# Patient Record
Sex: MALE | Race: WHITE | NOT HISPANIC OR LATINO | Employment: FULL TIME | URBAN - METROPOLITAN AREA
[De-identification: names, ages, dates, MRNs, and addresses within clinical notes are randomized per-mention and may not be internally consistent; named-entity substitution may affect disease eponyms.]

---

## 2023-06-16 PROCEDURE — 99284 EMERGENCY DEPT VISIT MOD MDM: CPT | Mod: 25

## 2023-06-16 PROCEDURE — 250N000013 HC RX MED GY IP 250 OP 250 PS 637: Performed by: EMERGENCY MEDICINE

## 2023-06-16 PROCEDURE — 29125 APPL SHORT ARM SPLINT STATIC: CPT | Mod: RT

## 2023-06-16 RX ORDER — IBUPROFEN 600 MG/1
600 TABLET, FILM COATED ORAL ONCE
Status: COMPLETED | OUTPATIENT
Start: 2023-06-17 | End: 2023-06-16

## 2023-06-16 RX ADMIN — IBUPROFEN 600 MG: 600 TABLET, FILM COATED ORAL at 23:57

## 2023-06-17 ENCOUNTER — HOSPITAL ENCOUNTER (EMERGENCY)
Facility: CLINIC | Age: 53
Discharge: HOME | End: 2023-06-17
Attending: EMERGENCY MEDICINE | Admitting: EMERGENCY MEDICINE
Payer: COMMERCIAL

## 2023-06-17 ENCOUNTER — APPOINTMENT (OUTPATIENT)
Dept: GENERAL RADIOLOGY | Facility: CLINIC | Age: 53
End: 2023-06-17
Attending: EMERGENCY MEDICINE
Payer: COMMERCIAL

## 2023-06-17 VITALS
OXYGEN SATURATION: 98 % | DIASTOLIC BLOOD PRESSURE: 90 MMHG | RESPIRATION RATE: 20 BRPM | SYSTOLIC BLOOD PRESSURE: 148 MMHG | TEMPERATURE: 98.3 F | HEART RATE: 68 BPM

## 2023-06-17 DIAGNOSIS — S63.501A SPRAIN OF RIGHT WRIST, INITIAL ENCOUNTER: ICD-10-CM

## 2023-06-17 PROCEDURE — 73110 X-RAY EXAM OF WRIST: CPT | Mod: RT

## 2023-06-17 RX ORDER — IBUPROFEN 600 MG/1
600 TABLET, FILM COATED ORAL ONCE
Status: DISCONTINUED | OUTPATIENT
Start: 2023-06-17 | End: 2023-06-17 | Stop reason: HOSPADM

## 2023-06-17 RX ORDER — OXYCODONE AND ACETAMINOPHEN 5; 325 MG/1; MG/1
1 TABLET ORAL ONCE
Status: DISCONTINUED | OUTPATIENT
Start: 2023-06-17 | End: 2023-06-17 | Stop reason: HOSPADM

## 2023-06-17 ASSESSMENT — ACTIVITIES OF DAILY LIVING (ADL): ADLS_ACUITY_SCORE: 35

## 2023-06-17 NOTE — ED PROVIDER NOTES
History     Chief Complaint:  Fall       HPI   Chelo Norwood is a 52 year old male who presents for evaluation of a fall. The patient reports that he fell off a ladder around 1800, landing on his right arm. He endorses right wrist pain, but no other head or body injuries. He is not anticoagulated. The patient denies any notable past medical history.      Independent Historian:   None - Patient Only    Review of External Notes:   None       Medications:    The patient is not currently taking any prescribed medications.      Past Medical History:    The patient denies any significant past medical history.      Physical Exam   Patient Vitals for the past 24 hrs:   BP Temp Temp src Pulse Resp SpO2   06/16/23 2352 (!) 171/114 98.3  F (36.8  C) Temporal 71 20 97 %        Physical Exam  Constitutional:  Oriented to person, place, and time. Well appearing  HENT:   Head:    Normocephalic. Atraumatic   Mouth/Throat:   Oropharynx is clear and moist.   Eyes:    EOM are normal. Pupils are equal, round, and reactive to light.   Neck:    Neck supple.   Cardiovascular:  Normal rate, regular rhythm and normal heart sounds.      Exam reveals no gallop and no friction rub.       No murmur heard.  Pulmonary/Chest:  Effort normal and breath sounds normal.      No respiratory distress. No wheezes. No rales.      No reproducible chest wall pain.  Abdominal:   Soft. No distension. No tenderness. No rebound and no guarding.   Musculoskeletal:  Normal range of motion. No midline spine tenderness. Right dorsal wrist tenderness. No deformity. Limited ROM due to pain. 2+ distal pulses.  Neurological:   Alert and oriented to person, place, and time.           Moves all 4 extremities spontaneously. GCS 15. Strength and sensation are intact to upper extremity.  Skin:    No rash noted. No pallor. No abrasions or ecchymosis. Notable swelling    Emergency Department Course     Imaging:  XR Wrist Right G/E 3 Views   Final Result   IMPRESSION:    3  views right wrist. Mild to moderate dorsal soft tissue swelling about the carpal structures. The osseous structures are intact and normally mineralized.  There is no evidence of fracture or dislocation.  There are no lytic, blastic, or destructive    lesions.  The joint spaces are well preserved.  There is no evidence of joint effusion.   If continued clinical concern, consider conservative therapy, follow-up films, and/or clinical/orthopedic follow-up recommended.                     Report per radiology      Emergency Department Course & Assessments:      Interventions:  Medications   oxyCODONE-acetaminophen (PERCOCET) 5-325 MG per tablet 1 tablet (has no administration in time range)   ibuprofen (ADVIL/MOTRIN) tablet 600 mg (has no administration in time range)   ibuprofen (ADVIL/MOTRIN) tablet 600 mg (600 mg Oral $Given 6/16/23 9248)        Assessments:  0235 I obtained history and examined the patient as noted above. Velcro cock-up wrist splint will be applied by RN.    Independent Interpretation (X-rays, CTs, rhythm strip):  X-ray of the right wrist-no acute fracture.  Interpreted by myself.  Consultations/Discussion of Management or Tests:  None        Social Determinants of Health affecting care:   None    Disposition:  The patient was discharged to home.     Impression & Plan      Medical Decision Making:  Chelo Norwood is a 52 year old male who presents for evaluation of wrist pain following a fall off a ladder. He is tender over the right dorsal wrist.  X-rays were obtained and show no evidence of fracture.  The patient is neurologically intact and has brisk capillary refill and strong pulses. The patient was offered a Velcro splint for comfort which they did want. I informed the patient that it is possible that there is an occult fracture not seen and that if they have persistent pain, that they need to follow up with orthopedics. Otherwise, they may follow up with their PCP. The patient was given return  precautions and follow up instructions and state understanding of these and have the ability to comply.    Diagnosis:    ICD-10-CM    1. Sprain of right wrist, initial encounter  S63.501A              Scribe Disclosure:  I, Gustabo Posey, am serving as a scribe at 2:41 AM on 6/17/2023 to document services personally performed by Ki Carpio MD based on my observations and the provider's statements to me.   6/17/2023   Ki Carpio MD Shapin, Ryan P, MD  06/17/23 0629

## 2023-06-17 NOTE — ED NOTES
Patient moved to hallway r/t other critical patient needing bed. Refused to transfer in bed, would not sit on bed. Patient walked back towards waiting room while room was being cleaned.

## 2023-06-17 NOTE — ED TRIAGE NOTES
Pt presents to the ED with complaint of fall off a ladder 8-10ft and landing on right wrist. Pain 10/10. Left wrist swelling. Not on blood thinner. Denies hitting head.      Triage Assessment     Row Name 06/16/23 8812       Triage Assessment (Adult)    Airway WDL WDL       Respiratory WDL    Respiratory WDL WDL       Skin Circulation/Temperature WDL    Skin Circulation/Temperature WDL X       Cardiac WDL    Cardiac WDL WDL       Peripheral/Neurovascular WDL    Peripheral Neurovascular WDL WDL       Cognitive/Neuro/Behavioral WDL    Cognitive/Neuro/Behavioral WDL WDL